# Patient Record
Sex: FEMALE | URBAN - METROPOLITAN AREA
[De-identification: names, ages, dates, MRNs, and addresses within clinical notes are randomized per-mention and may not be internally consistent; named-entity substitution may affect disease eponyms.]

---

## 2018-08-15 ENCOUNTER — HISTORICAL (OUTPATIENT)
Dept: ADMINISTRATIVE | Facility: HOSPITAL | Age: 83
End: 2018-08-15

## 2018-08-15 LAB
ALBUMIN SERPL-MCNC: 3.7 GM/DL (ref 3.4–5)
ALBUMIN/GLOB SERPL: 1.2 RATIO (ref 1.1–2)
ALP SERPL-CCNC: 120 UNIT/L (ref 38–126)
ALT SERPL-CCNC: 22 UNIT/L (ref 12–78)
APPEARANCE, UA: CLEAR
AST SERPL-CCNC: 14 UNIT/L (ref 15–37)
BACTERIA #/AREA URNS AUTO: NORMAL /HPF
BILIRUB SERPL-MCNC: 0.9 MG/DL (ref 0.2–1)
BILIRUB UR QL STRIP: NEGATIVE
BILIRUBIN DIRECT+TOT PNL SERPL-MCNC: 0.1 MG/DL (ref 0–0.5)
BILIRUBIN DIRECT+TOT PNL SERPL-MCNC: 0.8 MG/DL (ref 0–0.8)
BUN SERPL-MCNC: 11 MG/DL (ref 7–18)
CALCIUM SERPL-MCNC: 8.9 MG/DL (ref 8.5–10.1)
CHLORIDE SERPL-SCNC: 105 MMOL/L (ref 98–107)
CHOLEST SERPL-MCNC: 267 MG/DL (ref 0–200)
CHOLEST/HDLC SERPL: 6.2 {RATIO} (ref 0–4)
CO2 SERPL-SCNC: 26 MMOL/L (ref 21–32)
COLOR UR: YELLOW
CREAT SERPL-MCNC: 0.69 MG/DL (ref 0.55–1.02)
GLOBULIN SER-MCNC: 3 GM/DL (ref 2.4–3.5)
GLUCOSE (UA): NEGATIVE
GLUCOSE SERPL-MCNC: 95 MG/DL (ref 74–106)
HDLC SERPL-MCNC: 43 MG/DL (ref 35–60)
HGB UR QL STRIP: NEGATIVE
KETONES UR QL STRIP: NEGATIVE
LDLC SERPL CALC-MCNC: 184 MG/DL (ref 0–129)
LEUKOCYTE ESTERASE UR QL STRIP: NEGATIVE
NITRITE UR QL STRIP.AUTO: NEGATIVE
PH UR STRIP: 8.5 [PH] (ref 5–9)
POTASSIUM SERPL-SCNC: 4.2 MMOL/L (ref 3.5–5.1)
PROT SERPL-MCNC: 6.7 GM/DL (ref 6.4–8.2)
PROT UR QL STRIP: NEGATIVE
RBC #/AREA URNS HPF: NORMAL /[HPF]
SODIUM SERPL-SCNC: 140 MMOL/L (ref 136–145)
SP GR UR STRIP: 1.01 (ref 1–1.03)
SQUAMOUS EPITHELIAL, UA: NORMAL
T3FREE SERPL-MCNC: 2.22 PG/ML (ref 2.18–3.98)
T4 FREE SERPL-MCNC: 1.01 NG/DL (ref 0.76–1.46)
TRIGL SERPL-MCNC: 202 MG/DL (ref 30–150)
TSH SERPL-ACNC: 2.71 MIU/L (ref 0.36–3.74)
UROBILINOGEN UR STRIP-ACNC: 0.2
VLDLC SERPL CALC-MCNC: 40 MG/DL
WBC #/AREA URNS AUTO: NORMAL /HPF (ref 0–3)

## 2019-06-24 ENCOUNTER — HISTORICAL (OUTPATIENT)
Dept: ADMINISTRATIVE | Facility: HOSPITAL | Age: 84
End: 2019-06-24

## 2022-04-29 VITALS
BODY MASS INDEX: 27.06 KG/M2 | SYSTOLIC BLOOD PRESSURE: 130 MMHG | WEIGHT: 137.81 LBS | DIASTOLIC BLOOD PRESSURE: 78 MMHG | HEIGHT: 60 IN

## 2022-05-02 NOTE — HISTORICAL [facility] CERNER
This is a historical note converted from [employer]. Formatting and pictures may have been removed.  Please reference [facility]o for original formatting and attached multimedia. Chief Complaint  New patient here with right base of thumb pain after hitting 3 weeks ago. Xrays today. C/o shocing pain just below thumb.  History of Present Illness  She is a pleasant 89-year-old whose had a right?hand and thumb pain. ?The pain is located over the base of the thumb and it shoots down?the thumb. ?It began after she bumped the area. ?She noticed it worse with pressing around the area and with certain movements?like hygiene.? She has used a brace intermittently without?relief. ?She denies any numbness or tingling.  Review of Systems  Comprehensive review of system?was performed with no exceptions other than noted in the history of present illness  Physical Exam  Vitals & Measurements  T:?36.8? ?C (Oral)? HR:?60(Peripheral)? BP:?130/78?  HT:?152?cm? WT:?62.5?kg? BMI:?27.05?  Gen: WN, WD, NAD  Card/Res: NL breathing, +distal pulses  Abdomen: ND  Musculoskeletal: She has some tenderness palpation?over her radial styloid. ?She is got?equivocal Finkelstein test. ?She is?no tenderness over her MCP or IP joint. ?Skin pretty good range of motion of her thumb. ?She has no pain over oblique pulley. ?She has no?active triggering.? She has a snapping sensation over her?EPL tendon.  Assessment/Plan  1.?De Quervains tenosynovitis, right?M65.4  We can try an injection?of her radial styloid.? If her pain persists we consider?advanced imaging  ?  Risks of cortisone were discussed with the patient including hypopigmentation subcutaneous fat atrophy, and post injection pain flare. The patient understood these risks and requested to proceed. The right first dorsal compartment was prepped with betadine. The 1cc of steroid and 1cc of lidocaine injection was administered under sterile techinique. The injection was administered in clinic by me, Vern Rascon,  and the patient tolerated the procedure well.  ?  Ordered:  betamethasone, 6 mg, Intra-Articular, Once, first dose 06/24/19 17:00:00 CDT, stop date 06/24/19 17:00:00 CDT  Lidocaine inj., 1 mL, Intra-Articular, Once, first dose 06/24/19 16:12:00 CDT, stop date 06/24/19 16:12:00 CDT  Office/Outpatient Visit Level 3 Established [id number] PC, De Quervains tenosynovitis, right, [facility], 06/24/19 16:12:00 CDT  ?  Orders:  asp/inj small joint/bursa [id number] PC, 06/24/19 16:12:00 CDT, [facility], Routine, 06/24/19 16:12:00 CDT  Clinic Follow-up PRN, 06/24/19 16:12:00 CDT, Future Order, [facility]  XR Wrist Right Minimum 3 Views, Routine, 06/24/19 15:45:00 CDT, Pain, None, Stretcher, Patient Has IV?, Rad Type, Right wrist pain, Not Scheduled, 06/24/19 15:45:00 CDT  Referrals  Clinic Follow-up PRN, 06/24/19 16:12:00 CDT, Future Order, [facility]   Problem List/Past Medical History  Ongoing  Adult onset hypothyroidism  At increased risk of bleeding from aspirin therapy  Cancer of skin, face  De Quervains tenosynovitis, right  Depression  General weakness  H/O: TIA  Hearing loss  Plantar fasciitis  Posterior tibial tendon dysfunction  Sinus node dysfunction  Thyroid disease  Historical  Bilateral cataracts  Procedure/Surgical History  Discission of secondary membrane [after cataract] (08/10/2015)  Discission of secondary membranous cataract (opacified posterior lens capsule and/or anterior hyaloid); laser surgery (eg, YAG laser) (1 or more stages) (08/10/2015)  Adjacent tissue transfer or rearrangement, eyelids, nose, ears and/or lips; defect 10 sq cm or less. (05/21/2012)  Excision, benign lesion including margins, except skin tag (unless listed elsewhere), trunk, arms or legs; excised diameter 0.5 cm or less. (05/21/2012)  Local excision or destruction of other lesion of nose (05/21/2012)  Other local excision or destruction of lesion or tissue of skin and subcutaneous tissue (05/21/2012)  Other  repair and plastic operations on nose (05/21/2012)  Repair, intermediate, wounds of scalp, axillae, trunk and/or extremities (excluding hands and feet); 2.5 cm or less. (05/21/2012)  [facility] - Extracapsular cataract extraction and insertion of intraocular lens  excision lower eyelid lesion   Medications  aspirin buffered 81 mg oral tablet, Daily  Celestone, 6 mg, Intra-Articular, Once  lidocaine 2% injectable solution, 1 mL, Intra-Articular, Once  liothyronine 5 mcg oral tablet, 10 mcg= 2 tab(s), Oral, Daily, 1 refills  METHYLCOBALAMIN 1000 MCG/ML SOLUTION, See Instructions, 6 refills  Proventil HFA 90 mcg/inh inhalation aerosol with adapter, 2 puff(s), INH, QID, 3 refills,? ?Not Taking, Completed Rx  Synthroid, Oral, Daily  Allergies  Levaquin?(Bradycardia, Hypotension, Lethargy)  Penicillin?(Lethargy, Hypotension, Bradycardia)  sulfamethoxazole?(rash, itching)  Social History  Abuse/Neglect  No, No, Yes, 06/24/2019  Alcohol  Current, Beer, 1-2 times per month, 05/19/2012  Employment/School  Highest education level: High school., 05/19/2012  Home/Environment  Lives with Alone., 05/19/2012  Nutrition/Health  Regular, 05/19/2012  Substance Use - Denies Substance Abuse, 05/19/2012  Tobacco  Smoker, current status unknown, No, 06/24/2019  Family History  Family history is negative  Health Maintenance  Health Maintenance  ???Pending?(in the next year)  ??? ??OverDue  ??? ? ? ?Pneumococcal Vaccine due??05/20/12??and every 1??day(s)  ??? ? ? ?Advance Directive due??01/01/19??and every 1??year(s)  ??? ? ? ?Cognitive Screening due??01/01/19??and every 1??year(s)  ??? ? ? ?Fall Risk Assessment due??01/01/19??and every 1??year(s)  ??? ? ? ?Functional Assessment due??01/01/19??and every 1??year(s)  ??? ? ? ?Geriatric Depression Screening due??01/01/19??and every 1??year(s)  ??? ? ? ?Smoking Cessation due??01/01/19??Variable frequency  ??? ??Due?  ??? ? ? ?ADL Screening due??06/24/19??and every 1??year(s)  ??? ? ? ?Bone Density  Screening due??06/24/19??Variable frequency  ??? ? ? ?Pneumococcal Vaccine due??06/24/19??and every?  ??? ? ? ?Tetanus Vaccine due??06/24/19??and every 10??year(s)  ??? ? ? ?Zoster Vaccine due??06/24/19??and every 100??year(s)  ??? ??Due In Future?  ??? ? ? ?Obesity Screening not due until??01/01/20??and every 1??year(s)  ???Satisfied?(in the past 1 year)  ??? ??Satisfied?  ??? ? ? ?Blood Pressure Screening on??06/24/19.??Satisfied by Etta Langley L. L.  ??? ? ? ?Body Mass Index Check on??06/24/19.??Satisfied by Etta Langley LMikel L.  ??? ? ? ?Diabetes Screening on??08/15/18.??Satisfied by Gertrude Nguyen MT  ??? ? ? ?Lipid Screening on??08/15/18.??Satisfied by Gertrude Nguyen MT  ??? ? ? ?Obesity Screening on??06/24/19.??Satisfied by Etta Langley L. LMikel  ?  Diagnostic Results  Radiographs show no fracture